# Patient Record
Sex: FEMALE | Race: WHITE | Employment: UNEMPLOYED | URBAN - NONMETROPOLITAN AREA
[De-identification: names, ages, dates, MRNs, and addresses within clinical notes are randomized per-mention and may not be internally consistent; named-entity substitution may affect disease eponyms.]

---

## 2017-02-28 ENCOUNTER — HOSPITAL ENCOUNTER (EMERGENCY)
Age: 27
Discharge: HOME | End: 2017-02-28
Payer: MEDICAID

## 2017-02-28 VITALS
HEART RATE: 86 BPM | OXYGEN SATURATION: 97 % | HEIGHT: 64 IN | SYSTOLIC BLOOD PRESSURE: 111 MMHG | DIASTOLIC BLOOD PRESSURE: 75 MMHG | WEIGHT: 190 LBS | TEMPERATURE: 97.8 F | RESPIRATION RATE: 18 BRPM | BODY MASS INDEX: 32.44 KG/M2

## 2017-02-28 DIAGNOSIS — H66.002 ACUTE SUPPURATIVE OTITIS MEDIA OF LEFT EAR WITHOUT SPONTANEOUS RUPTURE OF TYMPANIC MEMBRANE, RECURRENCE NOT SPECIFIED: Primary | ICD-10-CM

## 2017-02-28 DIAGNOSIS — H60.392 INFECTIVE OTITIS EXTERNA, LEFT: ICD-10-CM

## 2017-02-28 DIAGNOSIS — H91.92 HEARING LOSS, LEFT: ICD-10-CM

## 2017-02-28 PROCEDURE — 99282 EMERGENCY DEPT VISIT SF MDM: CPT | Performed by: NURSE PRACTITIONER

## 2017-02-28 PROCEDURE — 99282 EMERGENCY DEPT VISIT SF MDM: CPT

## 2017-02-28 RX ORDER — AMOXICILLIN AND CLAVULANATE POTASSIUM 875; 125 MG/1; MG/1
1 TABLET, FILM COATED ORAL 2 TIMES DAILY
COMMUNITY

## 2017-02-28 RX ORDER — GABAPENTIN 600 MG/1
600 TABLET ORAL 4 TIMES DAILY
COMMUNITY

## 2017-02-28 RX ORDER — NEOMYCIN SULFATE, POLYMYXIN B SULFATE, HYDROCORTISONE 3.5; 10000; 1 MG/ML; [USP'U]/ML; MG/ML
1 SOLUTION/ DROPS AURICULAR (OTIC)
COMMUNITY

## 2017-02-28 RX ORDER — LEVOTHYROXINE SODIUM 0.1 MG/1
100 TABLET ORAL DAILY
COMMUNITY

## 2017-02-28 RX ORDER — DULOXETIN HYDROCHLORIDE 60 MG/1
60 CAPSULE, DELAYED RELEASE ORAL DAILY
COMMUNITY

## 2017-02-28 RX ORDER — AMITRIPTYLINE HYDROCHLORIDE 25 MG/1
25 TABLET, FILM COATED ORAL 3 TIMES DAILY
COMMUNITY

## 2017-02-28 RX ORDER — CIPROFLOXACIN AND DEXAMETHASONE 3; 1 MG/ML; MG/ML
4 SUSPENSION/ DROPS AURICULAR (OTIC) 2 TIMES DAILY
Qty: 7.5 ML | Refills: 0 | Status: SHIPPED | OUTPATIENT
Start: 2017-02-28 | End: 2017-03-10

## 2017-02-28 RX ORDER — FLUOXETINE HYDROCHLORIDE 20 MG/1
40 CAPSULE ORAL DAILY
COMMUNITY

## 2017-02-28 RX ORDER — HYDROCODONE BITARTRATE AND ACETAMINOPHEN 5; 325 MG/1; MG/1
1 TABLET ORAL EVERY 6 HOURS PRN
Qty: 10 TABLET | Refills: 0 | Status: SHIPPED | OUTPATIENT
Start: 2017-02-28

## 2017-02-28 ASSESSMENT — PAIN SCALES - GENERAL: PAINLEVEL_OUTOF10: 10

## 2017-02-28 ASSESSMENT — ENCOUNTER SYMPTOMS: RESPIRATORY NEGATIVE: 1

## 2017-03-15 ENCOUNTER — OFFICE VISIT (OUTPATIENT)
Dept: OTOLARYNGOLOGY | Facility: CLINIC | Age: 27
End: 2017-03-15

## 2017-03-15 VITALS — TEMPERATURE: 98.6 F | HEIGHT: 64 IN | BODY MASS INDEX: 37.73 KG/M2 | WEIGHT: 221 LBS

## 2017-03-15 DIAGNOSIS — S02.2XXA CLOSED FRACTURE OF NASAL BONE, INITIAL ENCOUNTER: Primary | ICD-10-CM

## 2017-03-15 DIAGNOSIS — J34.2 NASAL SEPTAL DEFORMITY: ICD-10-CM

## 2017-03-15 PROCEDURE — 99203 OFFICE O/P NEW LOW 30 MIN: CPT | Performed by: OTOLARYNGOLOGY

## 2017-03-15 RX ORDER — NAPROXEN SODIUM 275 MG/1
275 TABLET ORAL 2 TIMES DAILY WITH MEALS
Qty: 20 TABLET | Refills: 0 | Status: SHIPPED | OUTPATIENT
Start: 2017-03-15 | End: 2017-03-29

## 2017-03-15 RX ORDER — DULOXETIN HYDROCHLORIDE 60 MG/1
60 CAPSULE, DELAYED RELEASE ORAL DAILY
COMMUNITY

## 2017-03-15 RX ORDER — HYDROCODONE BITARTRATE AND ACETAMINOPHEN 5; 325 MG/1; MG/1
1-2 TABLET ORAL EVERY 6 HOURS PRN
Qty: 24 TABLET | Refills: 0 | Status: SHIPPED | OUTPATIENT
Start: 2017-03-15 | End: 2017-03-20 | Stop reason: SDUPTHER

## 2017-03-15 RX ORDER — LEVOTHYROXINE SODIUM 0.1 MG/1
100 TABLET ORAL DAILY
COMMUNITY

## 2017-03-15 RX ORDER — FLUOXETINE HYDROCHLORIDE 20 MG/5ML
40 LIQUID ORAL DAILY
COMMUNITY

## 2017-03-15 NOTE — PROGRESS NOTES
Subjective   Griselda Pham is a 26 y.o. female.   CC: c/o mod severe pain wants med-gven none in previous ER yesterday    History of Present Illness   She was referred from the emergency room in [place] because there is not a provider that would see her locally.  She is reported to have a nasal fracture and mid minimally displaced base of the frontal process the right maxilla fracture.  She is no visual complaints no diplopia says her teeth fit together normally her main complaint is deformity of her nose check she says she is really a little bit better through her nose and she had before the injury.  Of note is that she's had nasal septal surgery in the past and said her breathing is worse after the surgery then was prior to the surgery 5 years ago apparently in [place].  Not having bleeding now that she had bleeding after the injury.  She feels like there is a  concavity of the nose on the right convexity on the left.  Complains pain she is not given any pain medicine in the Emergency room in [place].  She says that she has a history of ear problems in the distant past.  She says she didn't lose consciousness when she had the injury.      The following portions of the patient's history were reviewed and updated as appropriate: allergies, current medications, past family history, past medical history, past social history, past surgical history and problem list.      Griselda Pham reports that she has been smoking Cigarettes.  She does not have any smokeless tobacco history on file.  Patient is a tobacco user and has not been counseled for use of tobacco products    Family History   Problem Relation Age of Onset   • Thyroid disease Mother    • Cancer Father    • Thyroid disease Sister    • Diabetes Maternal Grandmother    • Seizures Maternal Grandmother          Current Outpatient Prescriptions:   •  DULoxetine (CYMBALTA) 60 MG capsule, Take 60 mg by mouth Daily., Disp: , Rfl:   •  FLUoxetine (PROzac)  20 MG/5ML solution, Take 40 mg by mouth Daily., Disp: , Rfl:   •  HYDROcodone-acetaminophen (NORCO) 5-325 MG per tablet, Take 1-2 tablets by mouth Every 6 (Six) Hours As Needed for Moderate Pain (4-6)., Disp: 24 tablet, Rfl: 0  •  levothyroxine (SYNTHROID, LEVOTHROID) 100 MCG tablet, Take 100 mcg by mouth Daily., Disp: , Rfl:   •  naproxen sodium (ANAPROX) 275 MG tablet, Take 1 tablet by mouth 2 (Two) Times a Day With Meals., Disp: 20 tablet, Rfl: 0      Past Medical History   Diagnosis Date   • Hypothyroidism          Review of Systems   HENT: Positive for facial swelling, hearing loss, nosebleeds and trouble swallowing. Negative for ear discharge, rhinorrhea and voice change.    Eyes: Negative.  Negative for visual disturbance.   Cardiovascular: Positive for palpitations.   Gastrointestinal: Positive for diarrhea.   Neurological: Positive for weakness, numbness and headaches.   All other systems reviewed and are negative.          Objective   Physical Exam   Constitutional: She is oriented to person, place, and time. She appears well-developed and well-nourished.   HENT:   Head: Normocephalic. Head is with contusion.   Right Ear: Hearing, tympanic membrane, external ear and ear canal normal.   Left Ear: Hearing, external ear and ear canal normal. Tympanic membrane is scarred.   Nose: Nasal deformity and septal deviation present. No mucosal edema, rhinorrhea or nose lacerations. No epistaxis. Right sinus exhibits no maxillary sinus tenderness and no frontal sinus tenderness. Left sinus exhibits no maxillary sinus tenderness and no frontal sinus tenderness.       Mouth/Throat: Uvula is midline and oropharynx is clear and moist. No trismus in the jaw. Normal dentition. No oropharyngeal exudate or posterior oropharyngeal edema.   Eyes: Conjunctivae and EOM are normal. Pupils are equal, round, and reactive to light.   Neck: Normal range of motion. Neck supple. No JVD present. No tracheal deviation present. No  thyromegaly present.   Cardiovascular: Normal rate.    Pulmonary/Chest: Effort normal.   Musculoskeletal: Normal range of motion.   Lymphadenopathy:        Head (right side): No submental, no submandibular, no tonsillar, no preauricular, no posterior auricular and no occipital adenopathy present.        Head (left side): No submental, no submandibular, no tonsillar, no preauricular, no posterior auricular and no occipital adenopathy present.     She has no cervical adenopathy.        Right cervical: No superficial cervical, no deep cervical and no posterior cervical adenopathy present.       Left cervical: No superficial cervical, no deep cervical and no posterior cervical adenopathy present.   Neurological: She is alert and oriented to person, place, and time. No cranial nerve deficit.   Skin: Skin is warm.   Psychiatric: She has a normal mood and affect. Her speech is normal and behavior is normal. Thought content normal.   Nursing note and vitals reviewed.      The CT Face studies were reviewed by me from the provided disc,and fractures of nose and maxilla were minimally displaced      Assessment/Plan   Griselda was seen today for other.    Diagnoses and all orders for this visit:    Closed fracture of nasal bone, initial encounter    Nasal septal deformity    Other orders  -     naproxen sodium (ANAPROX) 275 MG tablet; Take 1 tablet by mouth 2 (Two) Times a Day With Meals.  -     HYDROcodone-acetaminophen (NORCO) 5-325 MG per tablet; Take 1-2 tablets by mouth Every 6 (Six) Hours As Needed for Moderate Pain (4-6).     Explained to the patient that normally see patient's a few days after the injury to allow the swelling go down but because of her pain she wanted to be seen today.  Explained her the risks benefits of pain medicine  Use  in conjunction with her other medications in regards to GI issues, neurologic issues driving, dangerous activity.  She's not currently having bleeding or septal hematoma so I think  saline would be appropriate in the nose.  We'll see her back in a few days and reassess her external deformity we discussed the possibility of close reduction of a nasal fracture versus an open reduction pinning upon the findings.  F/u was arranged and callback issues discussed.

## 2017-03-20 ENCOUNTER — OFFICE VISIT (OUTPATIENT)
Dept: OTOLARYNGOLOGY | Facility: CLINIC | Age: 27
End: 2017-03-20

## 2017-03-20 VITALS — WEIGHT: 219 LBS | HEIGHT: 64 IN | BODY MASS INDEX: 37.39 KG/M2 | TEMPERATURE: 97.4 F

## 2017-03-20 DIAGNOSIS — J34.2 NASAL SEPTAL DEFORMITY: Primary | ICD-10-CM

## 2017-03-20 DIAGNOSIS — S02.2XXA CLOSED FRACTURE OF NASAL BONE, INITIAL ENCOUNTER: ICD-10-CM

## 2017-03-20 PROCEDURE — 99213 OFFICE O/P EST LOW 20 MIN: CPT | Performed by: OTOLARYNGOLOGY

## 2017-03-20 RX ORDER — HYDROCODONE BITARTRATE AND ACETAMINOPHEN 5; 325 MG/1; MG/1
1-2 TABLET ORAL EVERY 6 HOURS PRN
Qty: 24 TABLET | Refills: 0 | Status: SHIPPED | OUTPATIENT
Start: 2017-03-20

## 2017-03-20 NOTE — PROGRESS NOTES
Subjective   Griselda Pham is a 26 y.o. female.     F/u nasal fracture    History of Present Illness   A she comes in for follow-up of her nasal fracture.  She still having pain in her nose.  She is concerned about the appearance.  Not any fever chills.  She is recently diagnosed with pneumonia because of some crackles in her lungs.  She is placed on Levaquin.  She says she's used almost all of her narcotic pain medicine.  Says she's not driving or doing dangerous activity while taking the pain medicine.  Her Ta been reviewed.  He still has some congestion and trouble breathing through her nose and has had no epistaxis is no unusual drainage.      The following portions of the patient's history were reviewed and updated as appropriate: allergies, current medications, past family history, past medical history, past social history, past surgical history and problem list.      Review of Systems   Constitutional: Negative.    HENT: Positive for facial swelling and sinus pressure. Negative for nosebleeds and postnasal drip.    Respiratory: Positive for cough and shortness of breath.    Neurological: Positive for facial asymmetry.   Hematological: Negative.            Objective   Physical Exam   Constitutional: She is oriented to person, place, and time. She appears well-developed and well-nourished.   HENT:   Head: Normocephalic. Head is with contusion.   Right Ear: Hearing, tympanic membrane, external ear and ear canal normal.   Left Ear: Hearing, external ear and ear canal normal. Tympanic membrane is scarred.   Nose: Nasal deformity and septal deviation present. No mucosal edema, rhinorrhea or nose lacerations. No epistaxis. Right sinus exhibits no maxillary sinus tenderness and no frontal sinus tenderness. Left sinus exhibits no maxillary sinus tenderness and no frontal sinus tenderness.       Mouth/Throat: Uvula is midline and oropharynx is clear and moist. No trismus in the jaw. Normal dentition. No  oropharyngeal exudate or posterior oropharyngeal edema.   Eyes: Conjunctivae and EOM are normal. Pupils are equal, round, and reactive to light.   Neck: Normal range of motion. Neck supple. No JVD present. No tracheal deviation present. No thyromegaly present.   Cardiovascular: Normal rate.    Pulmonary/Chest: Effort normal.   Musculoskeletal: Normal range of motion.   Lymphadenopathy:        Head (right side): No submental, no submandibular, no tonsillar, no preauricular, no posterior auricular and no occipital adenopathy present.        Head (left side): No submental, no submandibular, no tonsillar, no preauricular, no posterior auricular and no occipital adenopathy present.     She has no cervical adenopathy.        Right cervical: No superficial cervical, no deep cervical and no posterior cervical adenopathy present.       Left cervical: No superficial cervical, no deep cervical and no posterior cervical adenopathy present.   Neurological: She is alert and oriented to person, place, and time. No cranial nerve deficit.   Skin: Skin is warm.   Psychiatric: She has a normal mood and affect. Her speech is normal and behavior is normal. Thought content normal.   Nursing note and vitals reviewed.          Assessment/Plan   Griselda was seen today for follow-up.    Diagnoses and all orders for this visit:    Nasal septal deformity    Closed fracture of nasal bone, initial encounter    Other orders  -     HYDROcodone-acetaminophen (NORCO) 5-325 MG per tablet; Take 1-2 tablets by mouth Every 6 (Six) Hours As Needed for Moderate Pain (4-6).     fractures not amenable to a close reduction because the impaction of the bones.  Also because or septal deviation I think that would require another surgery.  Another concern is her pneumonia.  She's not really candidate for an anesthetic because of her pneumonia which is been treated by others.  I suggested she come back in 6 weeks and she'll probably need to be considered for  open reduction and/or rhinoplasty involving the nasal bones and septum.  Total is no guarantee we can achieve the exact result of her nose was before but she is concerned about the above findings and also that there is a hump on her nose wasn't there before and nasal obstruction.  She seems to have a good understanding issues will follow-up.  Also reviewed the [facility] no new problems, I suggested that we cannot continue use narcotic pain medicine she should take anti-inflammatory medicines and continues Tylenol but not to overdose of Tylenol explained proper dosing.  She is understandable that he will follow-up as noted avoid trauma to the area.

## 2017-03-29 ENCOUNTER — TELEPHONE (OUTPATIENT)
Dept: OTOLARYNGOLOGY | Facility: CLINIC | Age: 27
End: 2017-03-29

## 2017-03-29 RX ORDER — MELOXICAM 7.5 MG/1
7.5 TABLET ORAL DAILY
Qty: 10 TABLET | Refills: 0 | Status: SHIPPED | OUTPATIENT
Start: 2017-03-29 | End: 2017-04-08

## 2017-03-29 RX ORDER — CHOLECALCIFEROL (VITAMIN D3) 125 MCG
5 CAPSULE ORAL NIGHTLY PRN
Qty: 10 TABLET | Refills: 0 | Status: SHIPPED | OUTPATIENT
Start: 2017-03-29 | End: 2017-04-08

## 2017-03-29 NOTE — TELEPHONE ENCOUNTER
Patient calls requesting narcotic pain medicine.  She was offered Mobic as an alternative makes her itch.  Reviewed her Ta reports and they're suspicious numbers of narcotics given from other providers which she did not tell me about.  She also told me she  Did not get pain medicine from the emergency room which appears not to be the case.  For that reason I will give her something for sleep and no more narcotics will be given.  Her main pain  Occurs while sleeping at night

## 2017-03-29 NOTE — TELEPHONE ENCOUNTER
"Called to let patient know that Dr. Floyd checked the State registry and she has been prescribed narcotics by other physicians as well and Dr. Floyd will not be able to write her any more pain medication for Narcotics. He will send her something for pain over to her pharmacy electronically and will send her something for sleep as well since her pain is keeping her up at night. She states that she will try the meloxicam for pain she did state it makes her itchy but not \"super itchy\" and as far as sleep aid she states that she knows trazadone doesn't work and that she does know zolpidem does work. I told her i would relay this to Dr. Floyd and for her to call her pharmacy before she heads over to make sure they received her prescriptions.   "

## 2017-03-29 NOTE — TELEPHONE ENCOUNTER
Called patient to let her know that Dr. Floyd has called her in a prescription for meloxicam to her pharmacy. He advised her to stop taking her naproxen and to only take the meloxicam left message for her to call the office back if she has any questions.

## 2017-03-29 NOTE — TELEPHONE ENCOUNTER
Pt called back stating that she has taken meloxicam before and it made her itch. She stated that Dr. Floyd had previously written her a prescription for hydrocodone and had a 1 circled on the refill and the pharmacy informed her that they could not refill this because it is a controlled substance so she is wanting Dr. Floyd to write her a refill for the hydrocodone. I told her I would talk with Dr. Floyd and see what he would like to do and give her a call back.